# Patient Record
Sex: FEMALE | Race: WHITE | NOT HISPANIC OR LATINO | URBAN - METROPOLITAN AREA
[De-identification: names, ages, dates, MRNs, and addresses within clinical notes are randomized per-mention and may not be internally consistent; named-entity substitution may affect disease eponyms.]

---

## 2018-09-17 ENCOUNTER — COMMUNICATION - HEALTHEAST (OUTPATIENT)
Dept: GERIATRICS | Facility: CLINIC | Age: 78
End: 2018-09-17

## 2018-09-17 ENCOUNTER — AMBULATORY - HEALTHEAST (OUTPATIENT)
Dept: ADMINISTRATIVE | Facility: CLINIC | Age: 78
End: 2018-09-17

## 2018-09-17 ENCOUNTER — RECORDS - HEALTHEAST (OUTPATIENT)
Dept: LAB | Facility: CLINIC | Age: 78
End: 2018-09-17

## 2018-09-17 LAB
HGB BLD-MCNC: 7.4 G/DL (ref 12–16)
PLATELET # BLD AUTO: 192 THOU/UL (ref 140–440)

## 2018-09-18 ENCOUNTER — OFFICE VISIT - HEALTHEAST (OUTPATIENT)
Dept: GERIATRICS | Facility: CLINIC | Age: 78
End: 2018-09-18

## 2018-09-18 DIAGNOSIS — J43.9 OBSTRUCTIVE EMPHYSEMA (H): ICD-10-CM

## 2018-09-18 DIAGNOSIS — S72.142D CLOSED DISPLACED INTERTROCHANTERIC FRACTURE OF LEFT FEMUR WITH ROUTINE HEALING, SUBSEQUENT ENCOUNTER: ICD-10-CM

## 2018-09-18 DIAGNOSIS — I10 HTN (HYPERTENSION): ICD-10-CM

## 2018-09-18 DIAGNOSIS — I25.10 CAD (CORONARY ARTERY DISEASE): ICD-10-CM

## 2018-09-18 DIAGNOSIS — D50.9 IRON DEFICIENCY ANEMIA: ICD-10-CM

## 2018-09-18 DIAGNOSIS — E66.811: ICD-10-CM

## 2018-09-18 DIAGNOSIS — E78.5 HYPERLIPIDEMIA WITH TARGET LOW DENSITY LIPOPROTEIN (LDL) CHOLESTEROL LESS THAN 70 MG/DL: ICD-10-CM

## 2018-09-18 DIAGNOSIS — I20.89 STABLE ANGINA (H): ICD-10-CM

## 2018-09-18 DIAGNOSIS — F17.200 SMOKING: ICD-10-CM

## 2018-09-18 DIAGNOSIS — K59.03 DRUG-INDUCED CONSTIPATION: ICD-10-CM

## 2018-09-20 ENCOUNTER — RECORDS - HEALTHEAST (OUTPATIENT)
Dept: LAB | Facility: CLINIC | Age: 78
End: 2018-09-20

## 2018-09-20 ENCOUNTER — OFFICE VISIT - HEALTHEAST (OUTPATIENT)
Dept: GERIATRICS | Facility: CLINIC | Age: 78
End: 2018-09-20

## 2018-09-20 DIAGNOSIS — I10 HTN (HYPERTENSION): ICD-10-CM

## 2018-09-20 DIAGNOSIS — E78.5 HYPERLIPIDEMIA WITH TARGET LOW DENSITY LIPOPROTEIN (LDL) CHOLESTEROL LESS THAN 70 MG/DL: ICD-10-CM

## 2018-09-20 DIAGNOSIS — K59.03 DRUG-INDUCED CONSTIPATION: ICD-10-CM

## 2018-09-20 DIAGNOSIS — I25.10 CAD (CORONARY ARTERY DISEASE): ICD-10-CM

## 2018-09-20 DIAGNOSIS — S72.142D CLOSED DISPLACED INTERTROCHANTERIC FRACTURE OF LEFT FEMUR WITH ROUTINE HEALING, SUBSEQUENT ENCOUNTER: ICD-10-CM

## 2018-09-20 DIAGNOSIS — D50.9 IRON DEFICIENCY ANEMIA: ICD-10-CM

## 2018-09-20 DIAGNOSIS — E66.811: ICD-10-CM

## 2018-09-20 DIAGNOSIS — J43.9 OBSTRUCTIVE EMPHYSEMA (H): ICD-10-CM

## 2018-09-20 DIAGNOSIS — I20.89 STABLE ANGINA (H): ICD-10-CM

## 2018-09-20 DIAGNOSIS — F17.200 SMOKING: ICD-10-CM

## 2018-09-20 LAB — HGB BLD-MCNC: 7.6 G/DL (ref 12–16)

## 2018-09-21 ENCOUNTER — OFFICE VISIT - HEALTHEAST (OUTPATIENT)
Dept: GERIATRICS | Facility: CLINIC | Age: 78
End: 2018-09-21

## 2018-09-21 DIAGNOSIS — I10 ESSENTIAL HYPERTENSION: ICD-10-CM

## 2018-09-21 DIAGNOSIS — S72.002A CLOSED LEFT HIP FRACTURE (H): ICD-10-CM

## 2018-09-21 DIAGNOSIS — E78.5 HYPERLIPIDEMIA: ICD-10-CM

## 2018-09-21 DIAGNOSIS — D50.0 BLOOD LOSS ANEMIA: ICD-10-CM

## 2018-09-21 DIAGNOSIS — F32.A DEPRESSION: ICD-10-CM

## 2018-09-21 DIAGNOSIS — I25.10 CAD (CORONARY ARTERY DISEASE): ICD-10-CM

## 2018-09-25 ENCOUNTER — OFFICE VISIT - HEALTHEAST (OUTPATIENT)
Dept: GERIATRICS | Facility: CLINIC | Age: 78
End: 2018-09-25

## 2018-09-25 DIAGNOSIS — D50.9 IRON DEFICIENCY ANEMIA: ICD-10-CM

## 2018-09-25 DIAGNOSIS — K59.03 DRUG-INDUCED CONSTIPATION: ICD-10-CM

## 2018-09-25 DIAGNOSIS — I20.89 STABLE ANGINA (H): ICD-10-CM

## 2018-09-25 DIAGNOSIS — E66.812: ICD-10-CM

## 2018-09-25 DIAGNOSIS — F17.200 SMOKING: ICD-10-CM

## 2018-09-25 DIAGNOSIS — I25.10 CAD (CORONARY ARTERY DISEASE): ICD-10-CM

## 2018-09-25 DIAGNOSIS — E78.5 HYPERLIPIDEMIA WITH TARGET LOW DENSITY LIPOPROTEIN (LDL) CHOLESTEROL LESS THAN 70 MG/DL: ICD-10-CM

## 2018-09-25 DIAGNOSIS — S72.142D CLOSED DISPLACED INTERTROCHANTERIC FRACTURE OF LEFT FEMUR WITH ROUTINE HEALING, SUBSEQUENT ENCOUNTER: ICD-10-CM

## 2018-09-25 DIAGNOSIS — J43.9 OBSTRUCTIVE EMPHYSEMA (H): ICD-10-CM

## 2018-09-25 DIAGNOSIS — I10 HTN (HYPERTENSION): ICD-10-CM

## 2018-09-25 ASSESSMENT — MIFFLIN-ST. JEOR: SCORE: 1340.44

## 2018-09-28 ENCOUNTER — AMBULATORY - HEALTHEAST (OUTPATIENT)
Dept: ADMINISTRATIVE | Facility: CLINIC | Age: 78
End: 2018-09-28

## 2018-10-02 ENCOUNTER — OFFICE VISIT - HEALTHEAST (OUTPATIENT)
Dept: GERIATRICS | Facility: CLINIC | Age: 78
End: 2018-10-02

## 2018-10-02 DIAGNOSIS — E78.5 HYPERLIPIDEMIA WITH TARGET LOW DENSITY LIPOPROTEIN (LDL) CHOLESTEROL LESS THAN 70 MG/DL: ICD-10-CM

## 2018-10-02 DIAGNOSIS — E66.811 CLASS 1 OBESITY DUE TO EXCESS CALORIES WITH SERIOUS COMORBIDITY AND BODY MASS INDEX (BMI) OF 34.0 TO 34.9 IN ADULT: ICD-10-CM

## 2018-10-02 DIAGNOSIS — I10 HTN (HYPERTENSION): ICD-10-CM

## 2018-10-02 DIAGNOSIS — K59.03 DRUG-INDUCED CONSTIPATION: ICD-10-CM

## 2018-10-02 DIAGNOSIS — J43.9 OBSTRUCTIVE EMPHYSEMA (H): ICD-10-CM

## 2018-10-02 DIAGNOSIS — E66.09 CLASS 1 OBESITY DUE TO EXCESS CALORIES WITH SERIOUS COMORBIDITY AND BODY MASS INDEX (BMI) OF 34.0 TO 34.9 IN ADULT: ICD-10-CM

## 2018-10-02 DIAGNOSIS — F17.200 SMOKING: ICD-10-CM

## 2018-10-02 DIAGNOSIS — S72.142D CLOSED DISPLACED INTERTROCHANTERIC FRACTURE OF LEFT FEMUR WITH ROUTINE HEALING, SUBSEQUENT ENCOUNTER: ICD-10-CM

## 2018-10-02 DIAGNOSIS — D50.9 IRON DEFICIENCY ANEMIA: ICD-10-CM

## 2018-10-02 DIAGNOSIS — I25.10 CAD (CORONARY ARTERY DISEASE): ICD-10-CM

## 2018-10-02 DIAGNOSIS — I20.89 STABLE ANGINA (H): ICD-10-CM

## 2018-10-02 DIAGNOSIS — T81.49XA POSTOPERATIVE WOUND INFECTION: ICD-10-CM

## 2018-10-03 ASSESSMENT — MIFFLIN-ST. JEOR: SCORE: 1335.9

## 2018-10-04 ENCOUNTER — OFFICE VISIT - HEALTHEAST (OUTPATIENT)
Dept: GERIATRICS | Facility: CLINIC | Age: 78
End: 2018-10-04

## 2018-10-04 DIAGNOSIS — F17.200 SMOKING: ICD-10-CM

## 2018-10-04 DIAGNOSIS — T81.49XA POSTOPERATIVE WOUND INFECTION: ICD-10-CM

## 2018-10-04 DIAGNOSIS — E66.812: ICD-10-CM

## 2018-10-04 DIAGNOSIS — S72.142D CLOSED DISPLACED INTERTROCHANTERIC FRACTURE OF LEFT FEMUR WITH ROUTINE HEALING, SUBSEQUENT ENCOUNTER: ICD-10-CM

## 2018-10-04 DIAGNOSIS — J43.9 OBSTRUCTIVE EMPHYSEMA (H): ICD-10-CM

## 2018-10-04 DIAGNOSIS — I82.402 ACUTE DEEP VEIN THROMBOSIS (DVT) OF LEFT LOWER EXTREMITY (H): ICD-10-CM

## 2018-10-04 DIAGNOSIS — K59.03 DRUG-INDUCED CONSTIPATION: ICD-10-CM

## 2018-10-04 DIAGNOSIS — I25.118 CORONARY ARTERY DISEASE OF NATIVE ARTERY OF NATIVE HEART WITH STABLE ANGINA PECTORIS (H): ICD-10-CM

## 2018-10-04 DIAGNOSIS — E78.5 HYPERLIPIDEMIA WITH TARGET LOW DENSITY LIPOPROTEIN (LDL) CHOLESTEROL LESS THAN 70 MG/DL: ICD-10-CM

## 2018-10-04 DIAGNOSIS — D50.9 IRON DEFICIENCY ANEMIA: ICD-10-CM

## 2018-10-04 ASSESSMENT — MIFFLIN-ST. JEOR: SCORE: 1340.44

## 2018-10-08 ENCOUNTER — AMBULATORY - HEALTHEAST (OUTPATIENT)
Dept: GERIATRICS | Facility: CLINIC | Age: 78
End: 2018-10-08

## 2021-06-02 VITALS
HEIGHT: 63 IN | WEIGHT: 197.6 LBS | HEIGHT: 63 IN | BODY MASS INDEX: 34.84 KG/M2 | WEIGHT: 196.6 LBS | BODY MASS INDEX: 35.01 KG/M2

## 2021-06-02 VITALS — BODY MASS INDEX: 35.01 KG/M2 | WEIGHT: 197.6 LBS | HEIGHT: 63 IN

## 2021-06-02 VITALS — WEIGHT: 187.4 LBS | BODY MASS INDEX: 33.2 KG/M2

## 2021-06-02 VITALS — WEIGHT: 196 LBS | BODY MASS INDEX: 34.72 KG/M2

## 2021-06-20 NOTE — PROGRESS NOTES
[facility]    Name:   Clari Dumont  : 1940  Facility:   [facility] [[id number]]   Room:   Code Status: FULL CODE -   Fac type:   SNF (Skilled Nursing Facility, TCU) -     CHIEF COMPLAINT / REASON FOR VISIT:  Chief Complaint   Patient presents with     Review Of Multiple Medical Conditions     TCU follow-up after hospitalization for close comminuted intertrochanteric fracture of the left femur.     [facility] from 09/10/18 until 18  [facility] from 18 until   [facility] from      Patient last seen by me on 18.    HPI: Clari is a 78 y.o. female with a history of obstructive emphysema (current smoking), coronary artery disease (history of MI, right coronary artery stent in  and , late stent thrombosis in 2015, and stable angina), hypertension, and hyperlipidemia who presented to [place] ED after a mechanical fall.  She apparently had tripped over a dog.  While she landed on her right hip, x-rays revealed a closed comminuted intertrochanteric fracture of the left femur, possibly through twisting rather than impact, and she underwent ORIF (gamma nail fixation).      She was last seen by her cardiologist in May 2018 and, at that time, was cleared for possible elective left knee surgery.  She has already had 3 knee surgeries, encompassing both knees.      TCU ISSUES    Surgical wounds/wound infection: There is a considerable amount of purulent looking drainage in the surgical wounds.  At my visit on 18, we started her on 500 mg of Keflex 3 times daily for 10 days.  Orthopedics requested an ABD pad or Superabsorber pad to the area twice daily to soak up drainage.  They also suggested Ace wraps from the ankle to the hip.  They did suggest that if the patient developed a fever, she should be sent to [place] ED.  There was still a good deal of drainage from the wound.  She did have a follow-up  "appointment with Ortho on 09/25/18, moved up from 10/03/18.  She was subsequently admitted to the hospital for infection.    She was seen by Ortho ([facility]) on 10/02/18, and it was determined that excessive drainage was likely due to malnutrition.  They recommended adding twice daily protein supplements and replacing the dressing with Aquacel.  She currently has 2 small wound vacs, one for each incisional site.    DVT: A blood clot was discovered during her hospitalization for wound infection, and she was started on rivaroxaban 15 mg to continue from 09/27/18 until 10/18/18, with a subsequent increase to 20 mg on 10/19/18.  It should be noted that at this time she is still on antibiotics.    Vaginal bleeding: She and her daughter report vaginal bleeding 2 days ago with a \"stringy clot.\"  It then stopped.  She denies any vaginal or rectal pain.  She did have a D&C about 4 years ago.    Ambulation: She is able to ambulate and is WBAT.    Pain management: Pain was noted to be significant and did not appear adequately controlled.  She was receiving one oxycodone (5 mg) every 6 hours scheduled and 1-2 tabs every 4 hours as needed.  Her daughter, who was in attendance (she has been sleeping here), told me she does not ask for pain pills.  The patient stated she did not want to bother anyone.  The result was increased pain later on.  We adjusted dosing to 2 tabs every 6 hours with 1 tab every 4 hours as needed.  She has also been taking cyclobenzaprine 3 times daily for muscle spasms.    In addition to the surgical site, she also has lumbosacral pain.  The analgesic regimen helps with this, along with the cyclobenzaprine.  We ordered warm packs as needed.  This apparently was somewhat effective at home.    Current pain medication regimen is effective; however, she is still rating the pain fairly high.  She tells me, \"it comes and goes.  Right now, about a 4 or 5.\"  Still, she appears comfortable, and with adequate " "pain management, she is able to sleep.    GERD: She is on an increased dose of aspirin (325 mg), and her pantoprazole was increased from 40 mg daily to 40 mg twice daily for the period of time that she is on that dose.    Constipation: There is concern about this.  She had not gone for 5 days but did have a bowel movement yesterday after having an enema.  Previous orders called for senna S2 tabs twice daily and as needed MiraLAX.  Still, this was insufficient, and with an increase in her analgesics, we changed her MiraLAX to daily scheduled.  Bowels are okay now.    Anemia: She is receiving 325 mg of ferrous sulfate twice daily, increased from once daily on 09/17/18 due to a hemoglobin of 7.4.  A follow-up hemoglobin today is only up to 7.6.    COPD/smoking: She self reports being a heavy smoker.  She does have orders for a 14 mg nicotine transdermal patch.  However, she told me she has not been using it, did not need it, and had no desire to smoke.  We discontinued it.  She does have orders for Advair Diskus, albuterol HFA, and DuoNebs.  The latter 3 are as needed.  She did have orders for albuterol nebs, but we discontinued them due to redundancy.    ROS: Pain at the surgical site can be \"sharp and shooting.\"  She also complains of lumbar pain and asks about a heating pad.  She has occasional heartburn (now on twice daily dosing of pantoprazole).  No headaches or chest pains, coughing or congestion, nausea or vomiting (has orders for ondansetron), dizziness or dyspnea, dysuria, difficulty chewing or swallowing, or problems with appetite or sleep.    Past Medical History:   Diagnosis Date     Arthritis of knee      B12 deficiency      CAD (coronary artery disease)      Chronic pain of left knee      Depression      History of colonic polyps      HLD (hyperlipidemia)      HTN (hypertension)      Intertrochanteric fracture of left femur (H)      Microscopic hematuria      Obstructive emphysema (H)      Postoperative " wound infection      Renal cyst      ST elevation myocardial infarction involving right coronary artery (H)      Stable angina (H)               Family History   Problem Relation Age of Onset     Diabetes Mother      Heart disease Mother      Diabetes Father      Heart disease Father      Colon cancer Paternal Grandmother      Social History     Social History     Marital status:      Spouse name: N/A     Number of children: N/A     Years of education: N/A     Social History Main Topics     Smoking status: Current Every Day Smoker     Packs/day: 0.25     Years: 50.00     Types: Cigarettes     Smokeless tobacco: Never Used     Alcohol use No     Drug use: No     Sexual activity: Not Currently     Partners: Male     Other Topics Concern     Not on file     Social History Narrative       MEDICATIONS: Reviewed from the MAR, physician orders, and/or earlier progress notes.    Current Outpatient Prescriptions   Medication Sig     acetaminophen (TYLENOL) 325 MG tablet Take 650 mg by mouth every 4 (four) hours as needed for pain.     albuterol (PROAIR HFA;PROVENTIL HFA;VENTOLIN HFA) 90 mcg/actuation inhaler Inhale 2 puffs every 4 (four) hours as needed for wheezing.     aspirin 81 MG EC tablet Take 81 mg by mouth 2 (two) times a day.     atorvastatin (LIPITOR) 40 MG tablet Take 40 mg by mouth daily.     bisacodyl (DULCOLAX) 10 mg suppository Insert 10 mg into the rectum daily as needed.     calcium, as carbonate, (OS-AYO) 500 mg calcium (1,250 mg) tablet Take 1 tablet by mouth 2 (two) times a day with meals.     cholecalciferol, vitamin D3, 1,000 unit tablet Take 2,000 Units by mouth daily.     cyanocobalamin 1000 MCG tablet Take 1,000 mcg by mouth daily.     cyclobenzaprine (FLEXERIL) 5 MG tablet Take 5 mg by mouth 3 (three) times a day as needed for muscle spasms.     ferrous sulfate 325 (65 FE) MG tablet Take 1 tablet by mouth 2 (two) times a day.      fluticasone-salmeterol (ADVAIR) 250-50 mcg/dose DISKUS Inhale  "1 puff 2 (two) times a day.     ipratropium-albuterol (DUO-NEB) 0.5-2.5 mg/3 mL nebulizer Inhale 3 mL every 6 (six) hours as needed.     lisinopril (PRINIVIL,ZESTRIL) 2.5 MG tablet Take 2.5 mg by mouth daily.     methyl salicylate-menthol Oint Apply topically 3 (three) times a day as needed.     metoprolol tartrate (LOPRESSOR) 25 MG tablet Take 25 mg by mouth 2 (two) times a day.     nitroglycerin (NITROSTAT) 0.4 MG SL tablet Place 0.4 mg under the tongue every 5 (five) minutes as needed for chest pain.     ondansetron (ZOFRAN-ODT) 4 MG disintegrating tablet Take 4 mg by mouth every 6 (six) hours as needed for nausea.     oxyCODONE (ROXICODONE) 5 MG immediate release tablet Take 5 mg by mouth every 4 (four) hours as needed.      pantoprazole (PROTONIX) 40 MG tablet Take 40 mg by mouth 2 (two) times a day before meals.     polyethylene glycol (MIRALAX) 17 gram packet Take 17 g by mouth daily as needed.     rivaroxaban 15 mg Tab Take 15 mg by mouth 2 (two) times a day.     [START ON 10/19/2018] rivaroxaban 20 mg Tab Take 20 mg by mouth daily.     senna-docusate (SENNOSIDES-DOCUSATE SODIUM) 8.6-50 mg tablet Take 2 tablets by mouth 2 (two) times a day.     ALLERGIES:   Allergies   Allergen Reactions     Effexor Xr [Venlafaxine]      DIET: 2 g sodium restriction    Vitals:    10/03/18 2105   BP: 106/65   Pulse: 66   Resp: 20   Temp: (!) 96.2  F (35.7  C)   SpO2: 95%   Weight: 196 lb 9.6 oz (89.2 kg)   Height: 5' 3\" (1.6 m)     Body mass index is 34.83 kg/(m^2).    EXAMINATION:   General: Pleasant, alert, conversant elderly female, lying in bed, in no apparent distress.    Head: Normocephalic and atraumatic.   Eyes: PERRLA, sclerae clear.   ENT: Moist oral mucosa.  She is edentulous and does not have her dentures with her.  Cardiovascular: Sinus tachycardia with a course 3/6 systolic ejection murmur at the apex, radiating to the aortic space.  Respiratory: Lungs clear to auscultation bilaterally.  She has oxygen " (currently on 2 L) but does not typically need it.  Abdomen: Soft and nontender.   Musculoskeletal/Extremities: Age-related degenerative joint disease.  Bilateral knees are enlarged, left >right.  2+  pretibial edema on the left (mildly improved).    Integument: There are several areas of bruising, including the lateral left breast, the left axillary area, and of course below the incisional site.  The long incisional site on the left thigh is to be left in place.  This wound, and the wound superior to that, over the greater trochanter, had a considerable amount of thin brown and tannish purulent drainage.  The report was that the wound was foul-smelling, and we did initiate oral antibiotics.  Today, her daughter tells me that the drainage is now yellow, although it does look better.  She had a ruptured blister of the left abdominal fold and one along the inner thigh.  Cognitive/Psychiatric: Alert and oriented.  Affect is euthymic.    DIAGNOSTICS:   No results found for this or any previous visit.    Lab Results   Component Value Date    HGB 7.6 (L) 09/20/2018     09/17/2018     CrCl cannot be calculated (No order found.).    ASSESSMENT/Plan:      ICD-10-CM    1. Closed displaced intertrochanteric fracture of left femur with routine healing, subsequent encounter S72.142D    2. Postoperative wound infection T81.49XA    3. Obstructive emphysema (H) J43.9    4. Smoking F17.200    5. CAD (coronary artery disease) I25.10    6. Stable angina (H) I20.8    7. HTN (hypertension) I10    8. Class 1 obesity due to excess calories with serious comorbidity and body mass index (BMI) of 34.0 to 34.9 in adult E66.09     Z68.34    9. Iron deficiency anemia D50.9    10. Drug-induced constipation K59.03    11. Hyperlipidemia with target low density lipoprotein (LDL) cholesterol less than 70 mg/dL E78.5      CHANGES:    None.     CARE PLAN:    The care plan has been reviewed and all orders signed. Changes to care plan, if any, as  noted. Otherwise, continue current plan of care.  Time spent with this patient was approximately 35 minutes, with greater than 50% spent in counseling and coordination of care that included a review of her recent hospital records.    The above has been created using voice recognition software. Please be aware that this may unintentionally  produce inaccuracies and/or nonsensical sentences.      Electronically signed by: Matteo Pham CNP

## 2021-06-20 NOTE — PROGRESS NOTES
[facility]    Name:   Clari Dumont  : 1940  Facility:   [facility] [[id number]]   Room:   Code Status: FULL CODE -   Fac type:   SNF (Skilled Nursing Facility, TCU) -     CHIEF COMPLAINT / REASON FOR VISIT:  Chief Complaint   Patient presents with     Review Of Multiple Medical Conditions     TCU follow-up after hospitalization for closed comminuted intertrochanteric fracture of the left femur.     [facility] from 09/10/18 until 18    Patient last seen by me on 18 and subsequently seen by Dr. Singleton on 18.    HPI: Clari is a 78 y.o. female with a history of obstructive emphysema (current smoking), coronary artery disease (history of MI, right coronary artery stent in  and , late stent thrombosis in 2015, and stable angina), hypertension, and hyperlipidemia who presented to [place] ED after a mechanical fall.  She apparently had tripped over a dog.  While she landed on her right hip, x-rays revealed a closed comminuted intertrochanteric fracture of the left femur, possibly through twisting rather than impact, and she underwent ORIF (gamma nail fixation).      She was last seen by her cardiologist in May 2018 and, at that time, was cleared for possible elective left knee surgery.  She has already had 3 knee surgeries, encompassing both knees.      TCU ISSUES    Surgical wounds/wound infection: There is a considerable amount of purulent looking drainage in the surgical wounds.  At my visit on 18, we started her on 500 mg of Keflex 3 times daily for 10 days.  Orthopedics requested an ABD pad or Superabsorber pad to the area twice daily to soak up drainage.  They also suggested Ace wraps from the ankle to the hip.  They did suggest that if the patient developed a fever, she should be sent to [place] ED.  There is still a good deal of drainage from the wound.  She does have a follow-up appointment with Ortho  "later today (09/25/18), moved up from 10/03/18.    Vaginal bleeding: She and her daughter report vaginal bleeding 2 days ago with a \"stringy clot.\"  It then stopped.  She denies any vaginal or rectal pain.  She did have a D&C about 4 years ago.    Ambulation: She is able to ambulate and is WBAT, although \"really hurts.\"    Pain management: Pain was noted to be significant and did not appear adequately controlled on the current regimen.  She was receiving 1 oxycodone every 6 hours scheduled and 1-2 tabs every 4 hours as needed.  Her daughter, who was in attendance (she has been sleeping here), told me she does not ask for pain pills.  The patient stated she did not want to bother anyone.  The result is increased pain later on.  We adjusted dosing to 2 tabs every 6 hours with 1 tab every 4 hours as needed.  She has also been taking cyclobenzaprine 3 times daily for muscle spasms.    In addition to the surgical site, she also has lumbosacral pain.  The analgesic regimen helps with this, along with the cyclobenzaprine.  We ordered warm packs as needed.  This apparently was somewhat effective at home.    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  She states that she couldn't do that and has had pain ever since.  Otherwise, with proper pain management, she is able to sleep.    GERD: She is on an increased dose of aspirin (325 mg), and her pantoprazole was increased from 40 mg daily to 40 mg twice daily for the period of time that she is on that dose.    Constipation: There is concern about this.  She had not gone for 5 days but did have a bowel movement yesterday after having an enema.  Previous orders called for senna S2 tabs twice daily and as needed MiraLAX.  Still, this was insufficient, and with an increase in her analgesics, we changed her " "MiraLAX to daily scheduled.  Bowels are okay now.    Anemia: She is receiving 325 mg of ferrous sulfate twice daily, increased from once daily on 09/17/18 due to a hemoglobin of 7.4.  A follow-up hemoglobin today is only up to 7.6.    COPD/smoking: She self reports being a heavy smoker.  She does have orders for a 14 mg nicotine transdermal patch.  However, she told me she has not been using it, did not need it, and had no desire to smoke.  We discontinued it.  She does have orders for Advair Diskus, albuterol HFA, and DuoNebs.  The latter 3 are as needed.  She did have orders for albuterol nebs, but we discontinued them due to redundancy.    ROS: Pain at the surgical site can be \"sharp and shooting.\"  She also complains of lumbar pain and asks about a heating pad.  She has occasional heartburn (now on twice daily dosing of pantoprazole).  No headaches or chest pains, coughing or congestion, nausea or vomiting (has orders for ondansetron), dizziness or dyspnea, dysuria, difficulty chewing or swallowing, or problems with appetite or sleep.    Past Medical History:   Diagnosis Date     Arthritis of knee      B12 deficiency      CAD (coronary artery disease)      Chronic pain of left knee      Depression      History of colonic polyps      HLD (hyperlipidemia)      HTN (hypertension)      Intertrochanteric fracture of left femur (H)      Microscopic hematuria      Obstructive emphysema (H)      Renal cyst      ST elevation myocardial infarction involving right coronary artery (H)      Stable angina (H)               Family History   Problem Relation Age of Onset     Diabetes Mother      Heart disease Mother      Diabetes Father      Heart disease Father      Colon cancer Paternal Grandmother      Social History     Social History     Marital status:      Spouse name: N/A     Number of children: N/A     Years of education: N/A     Social History Main Topics     Smoking status: Current Every Day Smoker     " Packs/day: 0.25     Years: 50.00     Types: Cigarettes     Smokeless tobacco: Never Used     Alcohol use No     Drug use: No     Sexual activity: Not Currently     Partners: Male     Other Topics Concern     Not on file     Social History Narrative     No narrative on file       MEDICATIONS: Reviewed from the MAR, physician orders, and/or earlier progress notes.    Current Outpatient Prescriptions   Medication Sig     methyl salicylate-menthol Oint Apply topically 3 (three) times a day as needed.     acetaminophen (TYLENOL) 325 MG tablet Take 650 mg by mouth every 4 (four) hours as needed for pain.     albuterol (PROAIR HFA;PROVENTIL HFA;VENTOLIN HFA) 90 mcg/actuation inhaler Inhale 2 puffs every 4 (four) hours as needed for wheezing.     aspirin 325 MG tablet Take 325 mg by mouth daily.     atorvastatin (LIPITOR) 40 MG tablet Take 40 mg by mouth daily.     bisacodyl (DULCOLAX) 10 mg suppository Insert 10 mg into the rectum daily as needed.     calcium, as carbonate, (OS-AYO) 500 mg calcium (1,250 mg) tablet Take 1 tablet by mouth 2 (two) times a day with meals.     cholecalciferol, vitamin D3, 1,000 unit tablet Take 2,000 Units by mouth daily.     cyanocobalamin 1000 MCG tablet Take 1,000 mcg by mouth daily.     cyclobenzaprine (FLEXERIL) 5 MG tablet Take 5 mg by mouth 3 (three) times a day as needed for muscle spasms.     ferrous sulfate 325 (65 FE) MG tablet Take 1 tablet by mouth 2 (two) times a day.      fluticasone-salmeterol (ADVAIR) 250-50 mcg/dose DISKUS Inhale 1 puff 2 (two) times a day.     ipratropium-albuterol (DUO-NEB) 0.5-2.5 mg/3 mL nebulizer Inhale 3 mL every 6 (six) hours as needed.     lisinopril (PRINIVIL,ZESTRIL) 2.5 MG tablet Take 2.5 mg by mouth daily.     metoprolol tartrate (LOPRESSOR) 25 MG tablet Take 25 mg by mouth 2 (two) times a day.     ondansetron (ZOFRAN-ODT) 4 MG disintegrating tablet Take 4 mg by mouth every 6 (six) hours as needed for nausea.     oxyCODONE (ROXICODONE) 5 MG  "immediate release tablet Take 10 mg by mouth every 6 (six) hours. PLUS 5 mg every 4 hours prn     pantoprazole (PROTONIX) 40 MG tablet Take 40 mg by mouth 2 (two) times a day before meals.     polyethylene glycol (MIRALAX) 17 gram packet Take 17 g by mouth daily as needed.     polyethylene glycol (MIRALAX) 17 gram packet Take 17 g by mouth daily. As needed dosing is also available.     senna-docusate (SENNOSIDES-DOCUSATE SODIUM) 8.6-50 mg tablet Take 2 tablets by mouth 2 (two) times a day.     ticagrelor 60 mg Tab Take 60 mg by mouth 2 (two) times a day.     ALLERGIES:   Allergies   Allergen Reactions     Effexor Xr [Venlafaxine]      DIET: 2 g sodium restriction    Vitals:    09/25/18 1835   BP: 109/68   Pulse: 66   Resp: 20   Temp: 96.9  F (36.1  C)   SpO2: 95%   Weight: 197 lb 9.6 oz (89.6 kg)   Height: 5' 3\" (1.6 m)     Body mass index is 35 kg/(m^2).    EXAMINATION:   General: Pleasant, alert, conversant elderly female, sitting in a wheelchair, in no apparent distress.  The room has a stale tobacco and perfume smell.  Her daughter still smokes.  Head: Normocephalic and atraumatic.   Eyes: PERRLA, sclerae clear.   ENT: Moist oral mucosa.  She is edentulous and does not have her dentures with her.  Cardiovascular: Sinus tachycardia with a course 3/6 systolic ejection murmur at the apex, radiating to the aortic space.  Respiratory: Lungs clear to auscultation bilaterally.  She has oxygen (currently on 2 L) but does not typically need it.  Abdomen: Soft and nontender.   Musculoskeletal/Extremities: Age-related degenerative joint disease.  Bilateral knees are enlarged, left >right.  2+ pedal and pretibial edema on the left.  We wrote orders for DONA hose, but this was changed to Ace wraps (from ankle to hip by Ortho.  Integument: There are several areas of bruising, including the lateral left breast, the left axillary area, and of course below the incisional site.  The long incisional site on the left thigh is to be " left in place.  This wound, and the wound superior to that, over the greater trochanter, had a considerable amount of thin brown and tannish purulent drainage.  The report was that the wound was foul-smelling, and we did initiate oral antibiotics.  Today, her daughter tells me that the drainage is now yellow, although it does look better.  She had a ruptured blister of the left abdominal fold and one along the inner thigh.  Cognitive/Psychiatric: Alert and oriented.  Affect is euthymic.    DIAGNOSTICS:   No results found for this or any previous visit.    Lab Results   Component Value Date    HGB 7.6 (L) 09/20/2018     09/17/2018     CrCl cannot be calculated (No order found.).    ASSESSMENT/Plan:      ICD-10-CM    1. Closed displaced intertrochanteric fracture of left femur with routine healing, subsequent encounter S72.142D    2. Postoperative wound infection, subsequent encounter T81.4XXD    3. Obstructive emphysema (H) J43.8    4. Smoking F17.200    5. CAD (coronary artery disease) I25.10    6. Stable angina (H) I20.8    7. HTN (hypertension) I10    8. Class 2 obesity without serious comorbidity with body mass index (BMI) of 35.0 to 35.9 in adult E66.9     Z68.35    9. Iron deficiency anemia D50.9    10. Drug-induced constipation K59.03    11. Hyperlipidemia with target low density lipoprotein (LDL) cholesterol less than 70 mg/dL E78.5      CHANGES:    None.     CARE PLAN:    The care plan has been reviewed and all orders signed. Changes to care plan, if any, as noted. Otherwise, continue current plan of care.      The above has been created using voice recognition software. Please be aware that this may unintentionally  produce inaccuracies and/or nonsensical sentences.      Electronically signed by: Matteo Pham, NORMAN

## 2021-06-20 NOTE — PROGRESS NOTES
[facility]    Name:   Clari Dumont  : 1940  Facility:   [facility] [[id number]]   Room:   Code Status: FULL CODE -   Fac type:   SNF (Skilled Nursing Facility, TCU) -     CHIEF COMPLAINT / REASON FOR VISIT:  Chief Complaint   Patient presents with     Discharge Summary     TCU discharge after hospitalization for closed comminuted intertrochanteric fracture of the left femur.     [facility] from 09/10/18 until 18 (left intertrochanteric femur fracture)  [facility]CU from 18 until 18  [facility] from 18 until 18 (postoperative wound infection)  [facility] TCU from 18 until 10/05/18      HPI: Clari is a 78 y.o. female with a history of obstructive emphysema (current smoking), coronary artery disease (history of MI, right coronary artery stent in  and , late stent thrombosis in 2015, and stable angina), hypertension, and hyperlipidemia who presented to [place] ED after a mechanical fall.  She apparently had tripped over a dog.  While she landed on her right hip, x-rays revealed a closed comminuted intertrochanteric fracture of the left femur, possibly through twisting rather than impact, and she underwent ORIF (gamma nail fixation).      She was last seen by her cardiologist in May 2018 and, at that time, was cleared for possible elective left knee surgery.  She has already had 3 knee surgeries, encompassing both knees.      TCU ISSUES    Surgical wounds/postoperative wound infection: There was a considerable amount of purulent looking drainage in the surgical wounds.  At my visit on 18, we started her on 500 mg of Keflex 3 times daily for 10 days.  Orthopedics requested an ABD pad or Superabsorber pad to the area twice daily to soak up drainage.  They also suggested Ace wraps from the ankle to the hip.  They did suggest that if the patient developed a fever, she  "should be sent to [place] ED.  There was still a good deal of drainage from the wound.  She did have a follow-up appointment with Ortho on 09/25/18 (moved up from 10/03/18) and was admitted to the hospital with postoperative wound infection, discharged back to [facility] on 09/27/18.  She initially had 2 small wound VACs that were eventually discontinued.    She was seen by Ortho ([facility]) on 10/02/18, and it was determined that excessive drainage was likely due to malnutrition.  They recommended adding twice daily protein supplements and replacing the dressing with Aquacel.  She had 2 small wound vacs, one for each incisional site.    DVT: A blood clot was discovered via duplex USS during her hospitalization for wound infection when she presented with left lower extremity swelling, and she was started on rivaroxaban 15 mg to continue from 09/27/18 until 10/18/18, with a subsequent increase to 20 mg on 10/19/18.  It should be noted that at this time she is still on antibiotics.    Vaginal bleeding: She and her daughter report vaginal bleeding 2 days ago with a \"stringy clot.\"  It then stopped.  She denies any vaginal or rectal pain.  She did have a D&C about 4 years ago.  Bleeding has since resolved.    Ambulation: She is able to ambulate and is WBAT.    Pain management: Pain was noted to be significant and did not appear adequately controlled.  She was receiving one oxycodone (5 mg) every 6 hours scheduled and 1-2 tabs every 4 hours as needed.  Her daughter, who was in attendance (she has been sleeping here), told me she does not ask for pain pills.  The patient stated she did not want to bother anyone.  The result was increased pain later on.  We adjusted dosing to 2 tabs every 6 hours with 1 tab every 4 hours as needed.  She has also been taking cyclobenzaprine 3 times daily for muscle spasms.    In addition to the surgical site, she also has lumbosacral pain.  The analgesic regimen helps " "with this, along with the cyclobenzaprine.  We ordered warm packs as needed.  This apparently was somewhat effective at home.  At discharge, she is doing well with this.    Current pain medication regimen is effective; however, she is still rating the pain fairly high.  She tells me, \"it comes and goes.  Right now, about a 4 or 5.\"  Still, she appears comfortable, and with adequate pain management, she is able to sleep.    GERD: She is on an increased dose of aspirin (325 mg), and her pantoprazole was increased from 40 mg daily to 40 mg twice daily for the period of time that she is on that dose.  No current complaints.    Constipation: There is concern about this.  She had not gone for 5 days but did have a bowel movement yesterday after having an enema.  Previous orders called for senna S2 tabs twice daily and as needed MiraLAX.  Still, this was insufficient, and with an increase in her analgesics, we changed her MiraLAX to daily scheduled.  Bowels are okay now.    Anemia: She is receiving 325 mg of ferrous sulfate twice daily, increased from once daily on 09/17/18 due to a hemoglobin of 7.4.  A follow-up hemoglobin was only up to 7.6.    COPD/smoking: She self reports being a heavy smoker.  She does have orders for a 14 mg nicotine transdermal patch.  However, she told me she has not been using it, did not need it, and had no desire to smoke.  We discontinued it.  She does have orders for Advair Diskus, albuterol HFA, and DuoNebs.  The latter 3 are as needed.  She did have orders for albuterol nebs, but we discontinued them due to redundancy.    Discharge planning: She will discharge on 10/05/18.  She will have a home RN for dressing changes and home PT provided by [facility].  Also, her son is a PCA.    ROS: Pain at the surgical site has pretty much resolved, but she is having pain in the left popliteal fossa where she had a DVT.  She also complains of lumbar pain and asks about a heating pad.  " She has occasional heartburn (now on twice daily dosing of pantoprazole).  No headaches or chest pains, coughing or congestion, nausea or vomiting (has orders for ondansetron), dizziness or dyspnea, dysuria, difficulty chewing or swallowing, or problems with appetite or sleep.    Past Medical History:   Diagnosis Date     Arthritis of knee      B12 deficiency      CAD (coronary artery disease)      Chronic pain of left knee      Depression      History of colonic polyps      HLD (hyperlipidemia)      HTN (hypertension)      Intertrochanteric fracture of left femur (H)      Microscopic hematuria      Obstructive emphysema (H)      Postoperative wound infection      Renal cyst      ST elevation myocardial infarction involving right coronary artery (H)      Stable angina (H)               Family History   Problem Relation Age of Onset     Diabetes Mother      Heart disease Mother      Diabetes Father      Heart disease Father      Colon cancer Paternal Grandmother      Social History     Social History     Marital status:      Spouse name: N/A     Number of children: N/A     Years of education: N/A     Social History Main Topics     Smoking status: Current Every Day Smoker     Packs/day: 0.25     Years: 50.00     Types: Cigarettes     Smokeless tobacco: Never Used     Alcohol use No     Drug use: No     Sexual activity: Not Currently     Partners: Male     Other Topics Concern     Not on file     Social History Narrative       MEDICATIONS: Reviewed from the MAR, physician orders, and/or earlier progress notes.    Current Outpatient Prescriptions   Medication Sig     acetaminophen (TYLENOL) 325 MG tablet Take 650 mg by mouth every 4 (four) hours as needed for pain.     albuterol (PROAIR HFA;PROVENTIL HFA;VENTOLIN HFA) 90 mcg/actuation inhaler Inhale 2 puffs every 4 (four) hours as needed for wheezing.     aspirin 81 MG EC tablet Take 81 mg by mouth 2 (two) times a day.     atorvastatin (LIPITOR) 40 MG tablet  Take 40 mg by mouth daily.     bisacodyl (DULCOLAX) 10 mg suppository Insert 10 mg into the rectum daily as needed.     calcium, as carbonate, (OS-AYO) 500 mg calcium (1,250 mg) tablet Take 1 tablet by mouth 2 (two) times a day with meals.     cholecalciferol, vitamin D3, 1,000 unit tablet Take 2,000 Units by mouth daily.     cyanocobalamin 1000 MCG tablet Take 1,000 mcg by mouth daily.     cyclobenzaprine (FLEXERIL) 5 MG tablet Take 5 mg by mouth 3 (three) times a day as needed for muscle spasms.     ferrous sulfate 325 (65 FE) MG tablet Take 1 tablet by mouth 2 (two) times a day.      fluticasone-salmeterol (ADVAIR) 250-50 mcg/dose DISKUS Inhale 1 puff 2 (two) times a day.     ipratropium-albuterol (DUO-NEB) 0.5-2.5 mg/3 mL nebulizer Inhale 3 mL every 6 (six) hours as needed.     lisinopril (PRINIVIL,ZESTRIL) 2.5 MG tablet Take 2.5 mg by mouth daily.     methyl salicylate-menthol Oint Apply topically 3 (three) times a day as needed.     metoprolol tartrate (LOPRESSOR) 25 MG tablet Take 25 mg by mouth 2 (two) times a day.     nitroglycerin (NITROSTAT) 0.4 MG SL tablet Place 0.4 mg under the tongue every 5 (five) minutes as needed for chest pain.     ondansetron (ZOFRAN-ODT) 4 MG disintegrating tablet Take 4 mg by mouth every 6 (six) hours as needed for nausea.     oxyCODONE (ROXICODONE) 5 MG immediate release tablet Take 5 mg by mouth every 4 (four) hours as needed.      pantoprazole (PROTONIX) 40 MG tablet Take 40 mg by mouth 2 (two) times a day before meals.     polyethylene glycol (MIRALAX) 17 gram packet Take 17 g by mouth daily as needed.     rivaroxaban 15 mg Tab Take 15 mg by mouth 2 (two) times a day.     [START ON 10/19/2018] rivaroxaban 20 mg Tab Take 20 mg by mouth daily.     senna-docusate (SENNOSIDES-DOCUSATE SODIUM) 8.6-50 mg tablet Take 2 tablets by mouth 2 (two) times a day.     ALLERGIES:   Allergies   Allergen Reactions     Effexor Xr [Venlafaxine]      DIET: 2 g sodium restriction    Vitals:     "10/04/18 1248   BP: 100/61   Pulse: 85   Resp: 18   Temp: 98.7  F (37.1  C)   SpO2: 96%   Weight: 197 lb 9.6 oz (89.6 kg)   Height: 5' 3\" (1.6 m)     Body mass index is 35 kg/(m^2).    EXAMINATION:   General: Pleasant, alert, conversant elderly female, lying in bed, in no apparent distress.    Head: Normocephalic and atraumatic.   Eyes: PERRLA, sclerae clear.   ENT: Moist oral mucosa.  She is edentulous and does not have her dentures with her.  Cardiovascular: Regular rate and rhythm with a coarse 3/6 systolic ejection murmur at the apex, radiating to the aortic space.  Respiratory: Lungs clear to auscultation bilaterally.  She has oxygen (currently on 2 L) but does not typically need it.  Abdomen: Soft and nontender.   Musculoskeletal/Extremities: Age-related degenerative joint disease.  Bilateral knees are enlarged, left >right.  1-2+  pretibial edema on the left (mildly improved).    Integument: There are several areas of bruising, including the lateral left breast, the left axillary area, and of course below the incisional site.  The long incisional site on the left thigh is to be left in place.  This wound, and the wound superior to that, over the greater trochanter, had a considerable amount of thin brown and tannish purulent drainage.  The report was that the wound was foul-smelling, and we did initiate oral antibiotics.  Today, her daughter tells me that the drainage is now yellow, although it does look better.  She had a ruptured blister of the left abdominal fold and one along the inner thigh.  Cognitive/Psychiatric: Alert and oriented.  Affect is euthymic.    DIAGNOSTICS:   No results found for this or any previous visit.    Lab Results   Component Value Date    HGB 7.6 (L) 09/20/2018     09/17/2018     CrCl cannot be calculated (No order found.).    ASSESSMENT/Plan:      ICD-10-CM    1. Closed displaced intertrochanteric fracture of left femur with routine healing, subsequent encounter S72.142D  "   2. Postoperative wound infection T81.49XA    3. Obstructive emphysema (H) J43.9    4. Acute deep vein thrombosis (DVT) of left lower extremity (H) I82.402    5. Smoking F17.200    6. Coronary artery disease of native artery of native heart with stable angina pectoris (H) I25.118    7. Class 2 obesity without serious comorbidity with body mass index (BMI) of 35.0 to 35.9 in adult E66.9     Z68.35    8. Iron deficiency anemia D50.9    9. Drug-induced constipation K59.03    10. Hyperlipidemia with target low density lipoprotein (LDL) cholesterol less than 70 mg/dL E78.5      DISCHARGE PLAN/FACE TO FACE:  I certify that this patient is under my care and that I had a face-to-face encounter that meets the physician face-to-face encounter requirements with this patient.     Date of Face-to-Face Encounter:  10/04/18     I certify that, based on my findings, the following services are medically necessary home health services: Home RN and home PT    My clinical findings support the need for the above skilled services because: (Please write a brief narrative summary that describes what the RN, PT, SLP, or other services will be doing in the home. A list of diagnoses in this section does not meet the CMS requirements): She will require a home RN for wound dressing changes, home PT for continued therapy in the home setting.    This patient is homebound because: (Please write a brief narrative summmary describing the functional limitations as to why this patient is homebound and specifically what makes this patient homebound.):  She is currently nonambulatory for anything other than very short distances.  Home PT necessitates home visits.    The patient is, or has been, under my care and I have initiated the establishment of the plan of care. This patient will be followed by a physician who will periodically review the plan of care.  Initial follow-up should be within 7-10 days.    Approximate time spent with this patient was  greater than 30 minutes with greater than 50% spent in discussions regarding services required upon discharge.      The above has been created using voice recognition software. Please be aware that this may unintentionally  produce inaccuracies and/or nonsensical sentences.      Electronically signed by: Matteo Pham CNP

## 2021-06-20 NOTE — PROGRESS NOTES
[facility]      Facility:    [facility] [[id number]]  Code Status: FULL CODE       Chief Complaint/Reason for Visit:  Chief Complaint   Patient presents with     H & P       HPI:   Clari is a 78 y.o. female    DISCHARGE SUMMARY   [facility]    Admission Date: 9/10/2018  Discharge Date: 9/14/2018    Hospital Course:   Clari Dumont is a 78 y.o. female with a history of coronary artery disease, COPD, hypertension, hyperlipidemia, depression, tobacco use, who was admitted on 9/10/18 from the ED after a mechanical fall, in the ED x-ray showed left trochanteric hip fracture.    Orthopedic surgery consulted, patient proceeded to OR on 9/11/18 for surgical repair with gamma nail fixation.    She has a history of coronary artery disease with history of a right coronary artery stent in 2011 and 2013, late stent thrombosis in December 2015, on aspirin and Brilanta. Last seen by her cardiologist at [facility] in 05/2018. At that visit she was cleared for possible elective knee surgery she was thinking of doing in future. She had a PET stress test and echocardiogram in 2017 which were normal.     Post surgery she did well though has had some difficulty with pain control limiting her ability to work with therapies though this has improved.     On 9/14/2018 she was stable for discharge to TCU for ongoing therapies.       Past Medical History:  Past Medical History:   Diagnosis Date     Arthritis of knee      B12 deficiency      CAD (coronary artery disease)      Chronic pain of left knee      Depression      History of colonic polyps      HLD (hyperlipidemia)      HTN (hypertension)      Intertrochanteric fracture of left femur (H)      Microscopic hematuria      Obstructive emphysema (H)      Renal cyst      ST elevation myocardial infarction involving right coronary artery (H)      Stable angina (H)            Surgical History:  Past Surgical History:   Procedure  Laterality Date     BUNIONECTOMY Left 10/2010     CORONARY STENT PLACEMENT  12/2011    10/2013     ESOPHAGOGASTRODUODENOSCOPY Left 08/07/2018    with biopsies      FINGER SURGERY  2008    thumb; bone chip removed      HIP PINNING Left 09/11/2018     LAPAROSCOPIC TUBAL LIGATION Left 1977     TOTAL KNEE ARTHROPLASTY Right 2011     TOTAL KNEE ARTHROPLASTY Left 10/2013     TRIGGER FINGER RELEASE  2008     TUMOR REMOVAL      fatty tumor of the head        Family History:   Family History   Problem Relation Age of Onset     Diabetes Mother      Heart disease Mother      Diabetes Father      Heart disease Father      Colon cancer Paternal Grandmother        Social History:    Social History     Social History     Marital status:      Spouse name: N/A     Number of children: N/A     Years of education: N/A     Social History Main Topics     Smoking status: Current Every Day Smoker     Packs/day: 0.25     Years: 50.00     Types: Cigarettes     Smokeless tobacco: Never Used     Alcohol use No     Drug use: No     Sexual activity: Not Currently     Partners: Male     Other Topics Concern     Not on file     Social History Narrative     No narrative on file        Medications:  Current Outpatient Prescriptions   Medication Sig     acetaminophen (TYLENOL) 325 MG tablet Take 650 mg by mouth every 4 (four) hours as needed for pain.     albuterol (PROAIR HFA;PROVENTIL HFA;VENTOLIN HFA) 90 mcg/actuation inhaler Inhale 2 puffs every 4 (four) hours as needed for wheezing.     aspirin 325 MG tablet Take 325 mg by mouth daily.     atorvastatin (LIPITOR) 40 MG tablet Take 40 mg by mouth daily.     bisacodyl (DULCOLAX) 10 mg suppository Insert 10 mg into the rectum daily as needed.     calcium, as carbonate, (OS-AYO) 500 mg calcium (1,250 mg) tablet Take 1 tablet by mouth 2 (two) times a day with meals.     cholecalciferol, vitamin D3, 1,000 unit tablet Take 2,000 Units by mouth daily.     cyanocobalamin 1000 MCG tablet Take 1,000  mcg by mouth daily.     cyclobenzaprine (FLEXERIL) 5 MG tablet Take 5 mg by mouth 3 (three) times a day as needed for muscle spasms.     ferrous sulfate 325 (65 FE) MG tablet Take 1 tablet by mouth 2 (two) times a day.      fluticasone-salmeterol (ADVAIR) 250-50 mcg/dose DISKUS Inhale 1 puff 2 (two) times a day.     ipratropium-albuterol (DUO-NEB) 0.5-2.5 mg/3 mL nebulizer Inhale 3 mL every 6 (six) hours as needed.     lisinopril (PRINIVIL,ZESTRIL) 2.5 MG tablet Take 2.5 mg by mouth daily.     metoprolol tartrate (LOPRESSOR) 25 MG tablet Take 25 mg by mouth 2 (two) times a day.     nicotine (NICODERM CQ) 14 mg/24 hr Place 1 patch on the skin daily as needed for smoking cessation.     ondansetron (ZOFRAN-ODT) 4 MG disintegrating tablet Take 4 mg by mouth every 6 (six) hours as needed for nausea.     oxyCODONE (ROXICODONE) 5 MG immediate release tablet Take 10 mg by mouth every 6 (six) hours. PLUS 5 mg every 4 hours prn     pantoprazole (PROTONIX) 40 MG tablet Take 40 mg by mouth 2 (two) times a day before meals.     polyethylene glycol (MIRALAX) 17 gram packet Take 17 g by mouth daily as needed.     polyethylene glycol (MIRALAX) 17 gram packet Take 17 g by mouth daily. As needed dosing is also available.     senna-docusate (SENNOSIDES-DOCUSATE SODIUM) 8.6-50 mg tablet Take 2 tablets by mouth 2 (two) times a day.     ticagrelor 60 mg Tab Take 60 mg by mouth 2 (two) times a day.       Allergies:  Allergies   Allergen Reactions     Effexor Xr [Venlafaxine]        Review of Systems:  Pertinent items are noted in HPI.      Physical Exam:   General: Patient is alert,   Vitals: BP , Temp , Pulse , RR , O2 sat .  HEENT: Head is NCAT. Eyes show no injection or icterus. Nares negative. Oropharynx well hydrated.  Neck: Supple. No tenderness or adenopathy. No JVD.  Lungs: Clear bilaterally. No wheezes.  Cardiovascular: Regular rate and rhythm, normal S1. S2.  Back: No spinal or CVA tenderness.  Abdomen: Soft, no tenderness on  exam. Bowel sounds present. No guarding rebound or rigidity.  : Deferred.  Extremities: No edema is noted.  Musculoskeletal:   Skin:  Psych: Mood appears good.      Labs:  Lab Results   Component Value Date    HGB 7.6 (L) 09/20/2018         Assessment/Plan:  1. Left hip fracture. S/p surgical intervention. Continue in therapy. Follow up with ortho.  2. HTN. On meds. Monitor BPs.  3. Anemia. ABLA from surgery. Monitor.  4. CAD. Hx stent. Hx angina.  5. Depression.   6. HLD. ON statin.  7. COPD. Stable.  8. Code status is full code.      Total time greater than 60 minutes, greater than 50% counseling and coordination of care, time spent in interview and examination of patient, review of records, discussion with nursing staff.      Electronically signed by: Kathryn Singleton MD

## 2021-06-20 NOTE — PROGRESS NOTES
"[facility]    Name:   Clari Dumont  : 1940  Facility:   [facility] [[id number]]   Room:   Code Status: FULL CODE -   Fac type:   SNF (Skilled Nursing Facility, TCU) -     CHIEF COMPLAINT / REASON FOR VISIT:  Chief Complaint   Patient presents with     Review Of Multiple Medical Conditions     TCU follow-up after hospitalization for closed comminuted intertrochanteric fracture of the left femur.     [facility] from 09/10/18 until 18    HPI: Clari is a 78 y.o. female with a history of obstructive emphysema (current smoking), coronary artery disease (history of MI, right coronary artery stent in  and , late stent thrombosis in 2015, and stable angina), hypertension, and hyperlipidemia who presented to [facility] ED after a mechanical fall.  She apparently had tripped over a dog.  While she landed on her right hip, x-rays revealed a closed comminuted intertrochanteric fracture of the left femur, possibly through twisting rather than impact, and she underwent ORIF (gamma nail fixation).      She was last seen by her cardiologist in May 2018 and, at that time, was cleared for possible elective left knee surgery.  She has already had 3 knee surgeries, encompassing both knees.      TCU ISSUES    Surgical wounds: There is a considerable amount of purulent looking drainage in the upper wound.    Ambulation: She is able to ambulate and is WBAT, although \"really hurts.\"    Pain management: Pain is significant and does not appear adequately controlled on the current regimen.  She is receiving 1 oxycodone every 6 hours scheduled and 1-2 tabs every 4 hours as needed.  Her daughter, who is in attendance, tells me she does not ask for pain pills.  The patient says she does not want to bother anyone.  The result is increased pain later on.  We are going to make an adjustment and provide her with 2 tabs every 6 hours with 1 tab every 4 hours as " "needed.  She is also taking cyclobenzaprine 3 times daily for muscle spasms.    GERD: She is on an increased dose of aspirin (325 mg), and her pantoprazole was increased from 40 mg daily to 40 mg twice daily.    Constipation: There is concern about this.  She had not gone for 5 days but did have a bowel movement yesterday after having an enema.  Current orders call for senna S2 tabs twice daily and as needed MiraLAX.  Still, this is somewhat insufficient, and with an increase in her analgesics as we are planning, we will need to provide better management by changing her MiraLAX to daily scheduled.    Anemia: She is receiving 325 mg of ferrous sulfate twice daily, increased from once daily on 09/17/18 due to a hemoglobin of 7.4.  A follow-up hemoglobin is scheduled for 09/20/18.    COPD/smoking: She self reports being a heavy smoker.  She does have orders for a 14 mg nicotine transdermal patch.  She has orders for Advair Diskus, albuterol HFA, albuterol nebs, and DuoNebs.  The latter 3 are as needed.  The albuterol nebs are redundant, and we will discontinue them.    ROS: Pain at the surgical site can be \"sharp and shooting.\"  She also complains of lumbar pain and asks about a heating pad.  She has occasional heartburn (now on twice daily dosing of pantoprazole).  No headaches or chest pains, coughing or congestion, nausea or vomiting (has orders for ondansetron), dizziness or dyspnea, dysuria, difficulty chewing or swallowing, or problems with appetite or sleep.    Past Medical History:   Diagnosis Date     Arthritis of knee      B12 deficiency      CAD (coronary artery disease)      Chronic pain of left knee      Depression      History of colonic polyps      HLD (hyperlipidemia)      HTN (hypertension)      Intertrochanteric fracture of left femur (H)      Microscopic hematuria      Obstructive emphysema (H)      Renal cyst      ST elevation myocardial infarction involving right coronary artery (H)      Stable " angina (H)               Family History   Problem Relation Age of Onset     Diabetes Mother      Heart disease Mother      Diabetes Father      Heart disease Father      Colon cancer Paternal Grandmother      Social History     Social History     Marital status:      Spouse name: N/A     Number of children: N/A     Years of education: N/A     Social History Main Topics     Smoking status: Current Every Day Smoker     Packs/day: 0.25     Years: 50.00     Types: Cigarettes     Smokeless tobacco: Never Used     Alcohol use No     Drug use: No     Sexual activity: Not Currently     Partners: Male     Other Topics Concern     Not on file     Social History Narrative     No narrative on file       MEDICATIONS: Reviewed from the MAR, physician orders, and/or earlier progress notes.  Updated by me today (09/18/18) with discontinuation of albuterol nebs and dosing changes for oxycodone and MiraLAX reflected below.  Current Outpatient Prescriptions   Medication Sig     polyethylene glycol (MIRALAX) 17 gram packet Take 17 g by mouth daily. As needed dosing is also available.     acetaminophen (TYLENOL) 325 MG tablet Take 650 mg by mouth every 4 (four) hours as needed for pain.     albuterol (PROAIR HFA;PROVENTIL HFA;VENTOLIN HFA) 90 mcg/actuation inhaler Inhale 2 puffs every 4 (four) hours as needed for wheezing.     aspirin 325 MG tablet Take 325 mg by mouth daily.     atorvastatin (LIPITOR) 40 MG tablet Take 40 mg by mouth daily.     bisacodyl (DULCOLAX) 10 mg suppository Insert 10 mg into the rectum daily as needed.     calcium, as carbonate, (OS-AYO) 500 mg calcium (1,250 mg) tablet Take 1 tablet by mouth 2 (two) times a day with meals.     cholecalciferol, vitamin D3, 1,000 unit tablet Take 2,000 Units by mouth daily.     cyanocobalamin 1000 MCG tablet Take 1,000 mcg by mouth daily.     cyclobenzaprine (FLEXERIL) 5 MG tablet Take 5 mg by mouth 3 (three) times a day as needed for muscle spasms.     ferrous sulfate  325 (65 FE) MG tablet Take 1 tablet by mouth 2 (two) times a day.      fluticasone-salmeterol (ADVAIR) 250-50 mcg/dose DISKUS Inhale 1 puff 2 (two) times a day.     ipratropium-albuterol (DUO-NEB) 0.5-2.5 mg/3 mL nebulizer Inhale 3 mL every 6 (six) hours as needed.     lisinopril (PRINIVIL,ZESTRIL) 2.5 MG tablet Take 2.5 mg by mouth daily.     metoprolol tartrate (LOPRESSOR) 25 MG tablet Take 25 mg by mouth 2 (two) times a day.     nicotine (NICODERM CQ) 14 mg/24 hr Place 1 patch on the skin daily as needed for smoking cessation.     nitroglycerin (NITROSTAT) 0.4 MG SL tablet Place 0.4 mg under the tongue every 5 (five) minutes as needed for chest pain.     ondansetron (ZOFRAN-ODT) 4 MG disintegrating tablet Take 4 mg by mouth every 6 (six) hours as needed for nausea.     oxyCODONE (ROXICODONE) 5 MG immediate release tablet Take 10 mg by mouth every 6 (six) hours. PLUS 5 mg every 4 hours prn     pantoprazole (PROTONIX) 40 MG tablet Take 40 mg by mouth 2 (two) times a day before meals.     polyethylene glycol (MIRALAX) 17 gram packet Take 17 g by mouth daily as needed.     senna-docusate (SENNOSIDES-DOCUSATE SODIUM) 8.6-50 mg tablet Take 2 tablets by mouth 2 (two) times a day.     ticagrelor 60 mg Tab Take 60 mg by mouth 2 (two) times a day.     ALLERGIES:   Allergies   Allergen Reactions     Effexor Xr [Venlafaxine]      DIET: 2 g sodium restriction    Vitals:    09/18/18 1053   BP: 107/60   Pulse: 66   Resp: 22   Temp: 96.8  F (36  C)   SpO2: 92%   Weight: 196 lb (88.9 kg)     There is no height or weight on file to calculate BMI.    EXAMINATION:   General: Pleasant, alert, conversant elderly female, sitting in a wheelchair, in no apparent distress.  Head: Normocephalic and atraumatic.   Eyes: PERRLA, sclerae clear.   ENT: Moist oral mucosa.  She is edentulous and does not have her dentures with her.  Cardiovascular: Sinus tachycardia with a 3/6 systolic ejection murmur at the left sternal border.  Respiratory:  Lungs clear to auscultation bilaterally.  She has oxygen but does not typically need it.  Abdomen: Soft and nontender.   Musculoskeletal/Extremities: Age-related degenerative joint disease.  Bilateral knees are enlarged, left >right.  2+ pedal and pretibial edema on the left.  We can write orders for DONA hose.  integument: There are several areas of bruising, including the lateral left breast, the left axillary area, and of course below the incisional site.  The long incisional site on the left thigh is to be left in place.  This wound, and the wound superior to that, over the greater trochanter, has a considerable amount of thin brown drainage.  The report is that this is foul-smelling.  We are going to treat with oral antibiotics.  She has a ruptured blister of the left abdominal fold and one along the inner thigh.  For this, we can order a barrier cream.  Cognitive/Psychiatric: Alert and oriented.  Affect is euthymic.    DIAGNOSTICS:   No results found for this or any previous visit.    Lab Results   Component Value Date    HGB 7.4 (L) 09/17/2018     09/17/2018     CrCl cannot be calculated (No order found.).    ASSESSMENT/Plan:      ICD-10-CM    1. Closed displaced intertrochanteric fracture of left femur with routine healing, subsequent encounter S72.142D    2. Postoperative wound infection, initial encounter T81.4XXA    3. Obstructive emphysema (H) J43.8    4. Smoking F17.200    5. CAD (coronary artery disease) I25.10    6. Stable angina (H) I20.8    7. HTN (hypertension) I10    8. Class 1 obesity with serious comorbidity in adult E66.9    9. Iron deficiency anemia D50.9    10. Drug-induced constipation K59.03    11. Hyperlipidemia with target low density lipoprotein (LDL) cholesterol less than 70 mg/dL E78.5      CHANGES:    Discontinue albuterol nebs.  Discontinue current oxycodone orders.  Start oxycodone 5 mg 2 tabs every 6 hours and 1 tab every 3 hours as needed.  DONA hose on in the morning, off at  bedtime.  Change MiraLAX from as needed to daily scheduled.  Hot packs while in therapy for lumbar pain.     CARE PLAN:    The care plan has been reviewed and all orders signed. Changes to care plan, if any, as noted. Otherwise, continue current plan of care.  Time spent with this patient was approximately 40 minutes, with greater than 50% spent in counseling and coordination of care that included a review of recent hospital records and medications medications, an assessment of her surgical wounds, and the writing of orders.    The above has been created using voice recognition software. Please be aware that this may unintentionally  produce inaccuracies and/or nonsensical sentences.      Electronically signed by: Matteo Pham, NORMAN

## 2021-06-20 NOTE — PROGRESS NOTES
"[facility]    Name:   Clari Dumont  : 1940  Facility:   [facility] [[id number]]   Room:   Code Status: FULL CODE -   Fac type:   SNF (Skilled Nursing Facility, TCU) -     CHIEF COMPLAINT / REASON FOR VISIT:  Chief Complaint   Patient presents with     Review Of Multiple Medical Conditions     TCU follow-up after hospitalization for closed comminuted intertrochanteric fracture of the left femur.     [facility] from 09/10/18 until 18    Patient last seen by me on 18.    HPI: Clari is a 78 y.o. female with a history of obstructive emphysema (current smoking), coronary artery disease (history of MI, right coronary artery stent in  and , late stent thrombosis in 2015, and stable angina), hypertension, and hyperlipidemia who presented to [facility] ED after a mechanical fall.  She apparently had tripped over a dog.  While she landed on her right hip, x-rays revealed a closed comminuted intertrochanteric fracture of the left femur, possibly through twisting rather than impact, and she underwent ORIF (gamma nail fixation).      She was last seen by her cardiologist in May 2018 and, at that time, was cleared for possible elective left knee surgery.  She has already had 3 knee surgeries, encompassing both knees.      TCU ISSUES    Surgical wounds: There is a considerable amount of purulent looking drainage in the surgical wounds.  At my last visit, we started her on 500 mg of Keflex 3 times daily for 10 days.  Orthopedics requested an ABD pad or Superabsorber pad to the area twice daily to soak up drainage.  They also suggested Ace wraps from the ankle to the hip.  They did suggest that if the patient developed a fever, she should be sent to [facility] ED.  She does have a follow-up appointment with Ortho on 18, moved up from 10/03/18.    Ambulation: She is able to ambulate and is WBAT, although \"really hurts.\"    Pain management: " Pain was noted to be significant and did not appear adequately controlled on the current regimen.  She was receiving 1 oxycodone every 6 hours scheduled and 1-2 tabs every 4 hours as needed.  Her daughter, who was in attendance (she has been sleeping here), told me she does not ask for pain pills.  The patient stated she did not want to bother anyone.  The result is increased pain later on.  We adjusted dosing to 2 tabs every 6 hours with 1 tab every 4 hours as needed.  She has also been taking cyclobenzaprine 3 times daily for muscle spasms.    In addition to the surgical site, she also has lumbosacral pain.  The analgesic regimen helps with this, along with the cyclobenzaprine.  We ordered warm packs as needed.  This apparently was somewhat effective at home.    Current pain medication regimen is effective; however, she tells me she did not sleep well last night because of the pain.    GERD: She is on an increased dose of aspirin (325 mg), and her pantoprazole was increased from 40 mg daily to 40 mg twice daily for the period of time that she is on that dose.    Constipation: There is concern about this.  She had not gone for 5 days but did have a bowel movement yesterday after having an enema.  Previous orders called for senna S2 tabs twice daily and as needed MiraLAX.  Still, this was insufficient, and with an increase in her analgesics, we changed her MiraLAX to daily scheduled.  Bowels are okay now.    Anemia: She is receiving 325 mg of ferrous sulfate twice daily, increased from once daily on 09/17/18 due to a hemoglobin of 7.4.  A follow-up hemoglobin today is only up to 7.6.    COPD/smoking: She self reports being a heavy smoker.  She does have orders for a 14 mg nicotine transdermal patch.  However, she tells me she has not been using it, does not need it, and has no desire to smoke.  We will discontinue it.  She has orders for Advair Diskus, albuterol HFA, albuterol nebs, and DuoNebs.  The latter 3 are as  "needed.  The albuterol nebs are redundant, and we will discontinue them.    ROS: Pain at the surgical site can be \"sharp and shooting.\"  She also complains of lumbar pain and asks about a heating pad.  She has occasional heartburn (now on twice daily dosing of pantoprazole).  No headaches or chest pains, coughing or congestion, nausea or vomiting (has orders for ondansetron), dizziness or dyspnea, dysuria, difficulty chewing or swallowing, or problems with appetite or sleep.    Past Medical History:   Diagnosis Date     Arthritis of knee      B12 deficiency      CAD (coronary artery disease)      Chronic pain of left knee      Depression      History of colonic polyps      HLD (hyperlipidemia)      HTN (hypertension)      Intertrochanteric fracture of left femur (H)      Microscopic hematuria      Obstructive emphysema (H)      Renal cyst      ST elevation myocardial infarction involving right coronary artery (H)      Stable angina (H)               Family History   Problem Relation Age of Onset     Diabetes Mother      Heart disease Mother      Diabetes Father      Heart disease Father      Colon cancer Paternal Grandmother      Social History     Social History     Marital status:      Spouse name: N/A     Number of children: N/A     Years of education: N/A     Social History Main Topics     Smoking status: Current Every Day Smoker     Packs/day: 0.25     Years: 50.00     Types: Cigarettes     Smokeless tobacco: Never Used     Alcohol use No     Drug use: No     Sexual activity: Not Currently     Partners: Male     Other Topics Concern     Not on file     Social History Narrative     No narrative on file       MEDICATIONS: Reviewed from the MAR, physician orders, and/or earlier progress notes.    Current Outpatient Prescriptions   Medication Sig     acetaminophen (TYLENOL) 325 MG tablet Take 650 mg by mouth every 4 (four) hours as needed for pain.     albuterol (PROAIR HFA;PROVENTIL HFA;VENTOLIN HFA) 90 " mcg/actuation inhaler Inhale 2 puffs every 4 (four) hours as needed for wheezing.     aspirin 325 MG tablet Take 325 mg by mouth daily.     atorvastatin (LIPITOR) 40 MG tablet Take 40 mg by mouth daily.     bisacodyl (DULCOLAX) 10 mg suppository Insert 10 mg into the rectum daily as needed.     calcium, as carbonate, (OS-AYO) 500 mg calcium (1,250 mg) tablet Take 1 tablet by mouth 2 (two) times a day with meals.     cholecalciferol, vitamin D3, 1,000 unit tablet Take 2,000 Units by mouth daily.     cyanocobalamin 1000 MCG tablet Take 1,000 mcg by mouth daily.     cyclobenzaprine (FLEXERIL) 5 MG tablet Take 5 mg by mouth 3 (three) times a day as needed for muscle spasms.     ferrous sulfate 325 (65 FE) MG tablet Take 1 tablet by mouth 2 (two) times a day.      fluticasone-salmeterol (ADVAIR) 250-50 mcg/dose DISKUS Inhale 1 puff 2 (two) times a day.     ipratropium-albuterol (DUO-NEB) 0.5-2.5 mg/3 mL nebulizer Inhale 3 mL every 6 (six) hours as needed.     lisinopril (PRINIVIL,ZESTRIL) 2.5 MG tablet Take 2.5 mg by mouth daily.     metoprolol tartrate (LOPRESSOR) 25 MG tablet Take 25 mg by mouth 2 (two) times a day.     nicotine (NICODERM CQ) 14 mg/24 hr Place 1 patch on the skin daily as needed for smoking cessation.     ondansetron (ZOFRAN-ODT) 4 MG disintegrating tablet Take 4 mg by mouth every 6 (six) hours as needed for nausea.     oxyCODONE (ROXICODONE) 5 MG immediate release tablet Take 10 mg by mouth every 6 (six) hours. PLUS 5 mg every 4 hours prn     pantoprazole (PROTONIX) 40 MG tablet Take 40 mg by mouth 2 (two) times a day before meals.     polyethylene glycol (MIRALAX) 17 gram packet Take 17 g by mouth daily as needed.     polyethylene glycol (MIRALAX) 17 gram packet Take 17 g by mouth daily. As needed dosing is also available.     senna-docusate (SENNOSIDES-DOCUSATE SODIUM) 8.6-50 mg tablet Take 2 tablets by mouth 2 (two) times a day.     ticagrelor 60 mg Tab Take 60 mg by mouth 2 (two) times a day.      ALLERGIES:   Allergies   Allergen Reactions     Effexor Xr [Venlafaxine]      DIET: 2 g sodium restriction    Vitals:    09/20/18 1405   BP: 120/68   Pulse: 94   Resp: 20   Temp: 98.1  F (36.7  C)   Weight: 187 lb 6.4 oz (85 kg)     There is no height or weight on file to calculate BMI.    EXAMINATION:   General: Pleasant, alert, conversant elderly female, sitting in a wheelchair, in no apparent distress.  The room has a stale tobacco and perfume smell.  Her daughter still smokes.  Head: Normocephalic and atraumatic.   Eyes: PERRLA, sclerae clear.   ENT: Moist oral mucosa.  She is edentulous and does not have her dentures with her.  Cardiovascular: Sinus tachycardia with a course 3/6 systolic ejection murmur at the apex, radiating to the aortic space.  Respiratory: Lungs clear to auscultation bilaterally.  She has oxygen (currently on 1 L) but does not typically need it.  Abdomen: Soft and nontender.   Musculoskeletal/Extremities: Age-related degenerative joint disease.  Bilateral knees are enlarged, left >right.  2+ pedal and pretibial edema on the left.  We wrote orders for DONA hose, but this was changed to Ace wraps (from ankle to hip by Ortho.  Integument: There are several areas of bruising, including the lateral left breast, the left axillary area, and of course below the incisional site.  The long incisional site on the left thigh is to be left in place.  This wound, and the wound superior to that, over the greater trochanter, had a considerable amount of thin brown and tannish purulent drainage.  The report was that the wound was foul-smelling, and we did initiate oral antibiotics.  No drainage can be seen on the current dressing.  She has a ruptured blister of the left abdominal fold and one along the inner thigh.  Cognitive/Psychiatric: Alert and oriented.  Affect is euthymic.    DIAGNOSTICS:   No results found for this or any previous visit.    Lab Results   Component Value Date    HGB 7.6 (L)  09/20/2018     09/17/2018     CrCl cannot be calculated (No order found.).    ASSESSMENT/Plan:      ICD-10-CM    1. Closed displaced intertrochanteric fracture of left femur with routine healing, subsequent encounter S72.142D    2. Postoperative wound infection, subsequent encounter T81.4XXD    3. Obstructive emphysema (H) J43.8    4. Smoking F17.200    5. CAD (coronary artery disease) I25.10    6. Stable angina (H) I20.8    7. HTN (hypertension) I10    8. Class 1 obesity with serious comorbidity in adult E66.9    9. Iron deficiency anemia D50.9    10. Drug-induced constipation K59.03    11. Hyperlipidemia with target low density lipoprotein (LDL) cholesterol less than 70 mg/dL E78.5      CHANGES:    Discontinue nicotine transdermal patch.     CARE PLAN:    The care plan has been reviewed and all orders signed. Changes to care plan, if any, as noted. Otherwise, continue current plan of care.      The above has been created using voice recognition software. Please be aware that this may unintentionally  produce inaccuracies and/or nonsensical sentences.      Electronically signed by: Matteo Pham, CNP